# Patient Record
Sex: MALE | Race: WHITE | NOT HISPANIC OR LATINO | Employment: OTHER | URBAN - METROPOLITAN AREA
[De-identification: names, ages, dates, MRNs, and addresses within clinical notes are randomized per-mention and may not be internally consistent; named-entity substitution may affect disease eponyms.]

---

## 2018-06-27 ENCOUNTER — OFFICE VISIT (OUTPATIENT)
Dept: PULMONOLOGY | Facility: CLINIC | Age: 63
End: 2018-06-27
Payer: COMMERCIAL

## 2018-06-27 VITALS
OXYGEN SATURATION: 97 % | DIASTOLIC BLOOD PRESSURE: 62 MMHG | HEART RATE: 85 BPM | HEIGHT: 69 IN | RESPIRATION RATE: 18 BRPM | SYSTOLIC BLOOD PRESSURE: 118 MMHG | WEIGHT: 315 LBS | BODY MASS INDEX: 46.65 KG/M2

## 2018-06-27 DIAGNOSIS — Z87.09 HISTORY OF RESPIRATORY FAILURE: ICD-10-CM

## 2018-06-27 DIAGNOSIS — G47.33 OBSTRUCTIVE SLEEP APNEA ON CPAP: Primary | ICD-10-CM

## 2018-06-27 DIAGNOSIS — R06.83 SNORING: ICD-10-CM

## 2018-06-27 DIAGNOSIS — G47.19 EXCESSIVE DAYTIME SLEEPINESS: ICD-10-CM

## 2018-06-27 DIAGNOSIS — R06.81 WITNESSED APNEIC SPELLS: ICD-10-CM

## 2018-06-27 PROCEDURE — 3008F BODY MASS INDEX DOCD: CPT | Mod: CPTII,S$GLB,, | Performed by: INTERNAL MEDICINE

## 2018-06-27 PROCEDURE — 99999 PR PBB SHADOW E&M-NEW PATIENT-LVL III: CPT | Mod: PBBFAC,,, | Performed by: INTERNAL MEDICINE

## 2018-06-27 PROCEDURE — 99204 OFFICE O/P NEW MOD 45 MIN: CPT | Mod: S$GLB,,, | Performed by: INTERNAL MEDICINE

## 2018-06-27 RX ORDER — MELOXICAM 15 MG/1
TABLET ORAL
COMMUNITY
Start: 2018-06-01

## 2018-06-27 RX ORDER — NEBIVOLOL 10 MG/1
10 TABLET ORAL DAILY
COMMUNITY

## 2018-06-27 RX ORDER — SITAGLIPTIN 100 MG/1
TABLET, FILM COATED ORAL
COMMUNITY
Start: 2018-06-02

## 2018-06-27 RX ORDER — TAMSULOSIN HYDROCHLORIDE 0.4 MG/1
CAPSULE ORAL
COMMUNITY
Start: 2018-04-25

## 2018-06-27 RX ORDER — LISINOPRIL AND HYDROCHLOROTHIAZIDE 20; 25 MG/1; MG/1
TABLET ORAL
COMMUNITY
Start: 2018-04-10

## 2018-06-27 RX ORDER — METFORMIN HYDROCHLORIDE 1000 MG/1
TABLET ORAL
COMMUNITY
Start: 2018-06-01

## 2018-06-27 RX ORDER — FUROSEMIDE 40 MG/1
TABLET ORAL
COMMUNITY
Start: 2018-06-21

## 2018-06-27 RX ORDER — ATORVASTATIN CALCIUM 20 MG/1
TABLET, FILM COATED ORAL
COMMUNITY
Start: 2018-04-10

## 2018-06-27 NOTE — PROGRESS NOTES
Initial Outpatient Pulmonary Evaluation       SUBJECTIVE:     History of Present Illness:  Patient is a 63 y.o. male referred for preop evaluation.  The patient is undergoing a left kidney lithotomy for a kidney stone.  He was advised to see a pulmonologist by his cardiologist, because 4 months ago while undergoing a cardiac procedure to have a stent placed, he had apnea, and had to be intubated.  Patient is known with obstructive sleep apnea last sleep study was done 3 years ago, he is on CPAP compliant with CPAP still have some snoring, feels tired, and has excessive daytime sleepiness with [place] Sleepiness Scale score today of 20.  Never smoker.  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 and wakes up 6-7 a.m..      Chief Complaint   Patient presents with    Sleep Apnea       Review of patient's allergies indicates:   Allergen Reactions    Lisinopril      Hiccups       Current Outpatient Prescriptions   Medication Sig Dispense Refill    nebivolol (BYSTOLIC) 10 MG Tab Take 10 mg by mouth once daily.      atorvastatin (LIPITOR) 20 MG tablet       furosemide (LASIX) 40 MG tablet       JANUVIA 100 mg Tab       lisinopril-hydrochlorothiazide (PRINZIDE,ZESTORETIC) 20-25 mg Tab       meloxicam (MOBIC) 15 MG tablet       metFORMIN (GLUCOPHAGE) 1000 MG tablet       tamsulosin (FLOMAX) 0.4 mg Cp24        No current facility-administered medications for this visit.        Past Medical History:   Diagnosis Date    Anemia     Arthritis     Diabetes mellitus     Hypertension     Pneumonia      Past Surgical History:   Procedure Laterality Date    [facility]10[facility]2018    [facility]    UMBILICAL HERNIA REPAIR Left 2015    [facility]     Family History   Problem Relation Age of Onset    Alzheimer's disease Mother     Cancer Father     Hypertension Sister     Hypertension Brother      Social History  "  Substance Use Topics    Smoking status: Never Smoker    Smokeless tobacco: Never Used    Alcohol use Yes      Comment: Occasionally/socially        Review of Systems:  Review of Systems   Constitutional: Positive for fatigue.   HENT:        Snoring   Eyes: Negative for visual disturbance.   Respiratory: Positive for apnea.    Cardiovascular: Negative for chest pain.        Coronary artery disease status post stent   Gastrointestinal: Negative for abdominal pain.   Endocrine:        Diabetes mellitus   Genitourinary: Positive for frequency.        Kidney stone   Musculoskeletal: Positive for arthralgias.   Skin: Negative for rash.   Allergic/Immunologic: Negative for immunocompromised state.   Neurological: Negative for seizures and speech difficulty.   Psychiatric/Behavioral: Negative for behavioral problems.       OBJECTIVE:     Vital Signs (Most Recent)  Pulse: 85 (06/27/18 0845)  Resp: 18 (06/27/18 0845)  BP: 118/62 (06/27/18 0845)  SpO2: 97 % (06/27/18 0845)  5' 9" (1.753 m)  (!) 149 kg (328 lb 7.8 oz)     Physical Exam:  Physical Exam   Constitutional: He is oriented to person, place, and time. He appears well-developed and well-nourished.   HENT:   Head: Normocephalic and atraumatic.   Mallampati 1   Eyes: EOM are normal.   Neck: Neck supple.   Neck circumference 23 in   Cardiovascular: Normal rate and regular rhythm.    Pulmonary/Chest: Effort normal.   Breath sounds decreased bilaterally   Abdominal: Soft. There is no tenderness.   Musculoskeletal: He exhibits no edema.   Neurological: He is alert and oriented to person, place, and time.   Skin: Skin is warm.         ASSESSMENT/PLAN:   Obstructive sleep apnea on CPAP    History of  respiratory failure intraop    Excessive daytime sleepiness    Snoring    Witnessed apneic spells    BMI 48      I have instructed the patient to bring his outpatient sleep study.  As well as his machine to download compliance report.    General weight loss/lifestyle " modification strategies discussed (elicit support from others; identify saboteurs; non-food rewards).  Diet interventions: low calorie (1000 kCal/d) deficit diet      Improving sleep hygiene was discussed with the patient  Advise to Sleep as long as necessary to feel rested (usually seven to eight hours for adults) and then get out of bed  Maintain a regular sleep schedule, particularly a regular wake-up time in the morning  Try not to force sleep  Avoid caffeinated beverages after lunch, alcohol near bedtime , smoking or other nicotine intake, particularly during the evening  Adjust the bedroom environment as needed to decrease stimuli (reduce ambient light, turn off the television or radio)  Avoid prolonged use of light-emitting screens (laptops, tablets, smartphones, ebooks) before bedtime   Resolve concerns or worries before bedtime  Exercise regularly for at least 20 minutes, preferably more than four to five hours prior to bedtime   Avoid daytime naps, especially if they are longer than 20 to 30 minutes or occur late in the day      Patient is at increased risk of postop pulmonary complications, including respiratory failure, I would advice to do his surgery under general anesthesia with intubation from the beginning of the procedure, and I would advised also to extubate him to CPAP.  No contraindication to proceed with planned surgery from respiratory standpoint.    Further recommendation to be done after I review his sleep study and his compliance report.    I will fax this note to Dr. Del Castillo surgery, Dr Saldivar cardiology.     Follow-up in about 6 weeks (around 8/8/2018).    This note was prepared using voice recognition system and is likely to have sound alike errors that may have been overlooked even after proof reading.  Please call me with any questions    Discussed diagnosis, its evaluation, treatment and usual course. All questions answered.    Thank you for the courtesy of participating in the care of  this patient    Yonis Bustillo MD

## 2018-06-27 NOTE — PATIENT INSTRUCTIONS
Improving sleep hygiene was discussed with the patient  Advise to Sleep as long as necessary to feel rested (usually seven to eight hours for adults) and then get out of bed  Maintain a regular sleep schedule, particularly a regular wake-up time in the morning  Try not to force sleep  Avoid caffeinated beverages after lunch, alcohol near bedtime , smoking or other nicotine intake, particularly during the evening  Adjust the bedroom environment as needed to decrease stimuli (reduce ambient light, turn off the television or radio)  Avoid prolonged use of light-emitting screens (laptops, tablets, smartphones, [employer]) before bedtime   Resolve concerns or worries before bedtime  Exercise regularly for at least 20 minutes, preferably more than four to five hours prior to bedtime   Avoid daytime naps, especially if they are longer than 20 to 30 minutes or occur late in the day    General weight loss/lifestyle modification strategies discussed (elicit support from others; identify saboteurs; non-food rewards).  Diet interventions: low calorie (1000 kCal/d) deficit diet

## 2018-08-24 ENCOUNTER — OFFICE VISIT (OUTPATIENT)
Dept: PULMONOLOGY | Facility: CLINIC | Age: 63
End: 2018-08-24
Payer: COMMERCIAL

## 2018-08-24 VITALS
SYSTOLIC BLOOD PRESSURE: 110 MMHG | BODY MASS INDEX: 46.65 KG/M2 | OXYGEN SATURATION: 96 % | WEIGHT: 315 LBS | HEART RATE: 75 BPM | RESPIRATION RATE: 17 BRPM | HEIGHT: 69 IN | DIASTOLIC BLOOD PRESSURE: 58 MMHG

## 2018-08-24 DIAGNOSIS — N20.0 KIDNEY STONE: ICD-10-CM

## 2018-08-24 DIAGNOSIS — Z87.09 HISTORY OF RESPIRATORY FAILURE: ICD-10-CM

## 2018-08-24 DIAGNOSIS — G47.61 PERIODIC LIMB MOVEMENT DISORDER: ICD-10-CM

## 2018-08-24 DIAGNOSIS — G47.33 OBSTRUCTIVE SLEEP APNEA ON CPAP: Primary | ICD-10-CM

## 2018-08-24 PROCEDURE — 3008F BODY MASS INDEX DOCD: CPT | Mod: CPTII,S$GLB,, | Performed by: INTERNAL MEDICINE

## 2018-08-24 PROCEDURE — 99999 PR PBB SHADOW E&M-EST. PATIENT-LVL III: CPT | Mod: PBBFAC,,, | Performed by: INTERNAL MEDICINE

## 2018-08-24 PROCEDURE — 99214 OFFICE O/P EST MOD 30 MIN: CPT | Mod: S$GLB,,, | Performed by: INTERNAL MEDICINE

## 2018-08-24 RX ORDER — DULOXETIN HYDROCHLORIDE 30 MG/1
CAPSULE, DELAYED RELEASE ORAL
COMMUNITY
Start: 2018-07-06

## 2018-08-24 RX ORDER — ACETAMINOPHEN 500 MG
TABLET ORAL
COMMUNITY

## 2018-08-24 RX ORDER — HYDROCODONE BITARTRATE AND ACETAMINOPHEN 10; 325 MG/1; MG/1
TABLET ORAL
COMMUNITY
Start: 2018-07-24

## 2018-08-24 RX ORDER — MULTIVITAMIN
1 TABLET ORAL
COMMUNITY

## 2018-08-24 NOTE — PROGRESS NOTES
Pulmonary Outpatient Follow Up Visit     Subjective:       Patient ID: Dwayne Mack is a 63 y.o. male.    Chief Complaint: Sleep Apnea      HPI     63-year-old male patient initially seen end of June 2018, for preop evaluation, he is supposed to go for left kidney lithotomy for kidney stone.    During a cardiac procedure for 5 months ago, the patient had apnea and had to be intubated.    He is known with obstructive sleep apnea according to in-lab split night polysomnography 2014.  Showed  events per hour, was titrated to CPAP 18 cm.  Patient states that he is compliant with CPAP.  He is noticing lately that he opens his mouth during the sleep.  Does not use chin strap.  Denies coughing wheezing shortness of breath. Denies chest pain.    He was advised to see a pulmonologist by his cardiologist, because 4 months ago while undergoing a cardiac procedure to have a stent placed, he had apnea, and had to be intubated    Never smoker.  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 and wakes up 6-7 a.m..      Review of Systems    Review of Systems   Constitutional: Positive for fatigue.   HENT:        Snoring   Eyes: Negative for visual disturbance.   Respiratory: Positive for apnea.    Cardiovascular: Negative for chest pain.        Coronary artery disease status post stent   Gastrointestinal: Negative for abdominal pain.   Endocrine:        Diabetes mellitus   Genitourinary: Positive for frequency.        Kidney stone   Musculoskeletal: Positive for arthralgias.   Skin: Negative for rash.   Allergic/Immunologic: Negative for immunocompromised state.   Neurological: Negative for seizures and speech difficulty.   Psychiatric/Behavioral: Negative for behavioral problems.       Outpatient Encounter Medications as of 8/24/2018   Medication Sig Dispense Refill    atorvastatin (LIPITOR) 20 MG tablet       cholecalciferol, vitamin D3, 5,000 unit Tab  "Take by mouth.      DULoxetine (CYMBALTA) 30 MG capsule       furosemide (LASIX) 40 MG tablet       HYDROcodone-acetaminophen (NORCO)  mg per tablet       IRON, FERROUS SULFATE, ORAL Take by mouth.      JANUVIA 100 mg Tab       lisinopril-hydrochlorothiazide (PRINZIDE,ZESTORETIC) 20-25 mg Tab       meloxicam (MOBIC) 15 MG tablet       metFORMIN (GLUCOPHAGE) 1000 MG tablet       multivitamin (THERAGRAN) per tablet Take 1 tablet by mouth.      nebivolol (BYSTOLIC) 10 MG Tab Take 10 mg by mouth once daily.      tamsulosin (FLOMAX) 0.4 mg Cp24        No facility-administered encounter medications on file as of 8/24/2018.        Objective:     Vital Signs (Most Recent)  Vital Signs  Pulse: 75  Resp: 17  SpO2: 96 %  BP: (!) 110/58  Height and Weight  Height: 5' 9" (175.3 cm)  Weight: (!) 149.3 kg (329 lb 2.4 oz)  BSA (Calculated - sq m): 2.7 sq meters  BMI (Calculated): 48.7  Weight in (lb) to have BMI = 25: 168.9]  Wt Readings from Last 3 Encounters:   08/24/18 (!) 149.3 kg (329 lb 2.4 oz)   06/27/18 (!) 149 kg (328 lb 7.8 oz)     Temp Readings from Last 3 Encounters:   No data found for Temp     Height: 5' 9" (175.3 cm)          Physical Exam  Physical Exam   Constitutional: He is oriented to person, place, and time. He appears well-developed and well-nourished.   HENT:   Head: Normocephalic and atraumatic.   Mallampati 1   Eyes: EOM are normal.   Neck: Neck supple.   Neck circumference 23 in   Cardiovascular: Normal rate and regular rhythm.    Pulmonary/Chest: Effort normal.   Breath sounds decreased bilaterally   Abdominal: Soft. There is no tenderness.   Musculoskeletal: He exhibits no edema.   Neurological: He is alert and oriented to person, place, and time.   Skin: Skin is warm.        Diagnostic Results:    Pulmonary Function Tests 8/24/2018   SpO2 96   Height 69.000   Weight 5266.35   BMI (Calculated) 48.7   Some recent data might be hidden     Chest x-ray July 2018 at [facility] showed thoracic spine abnormalities.  Clear lungs.      Split Night polysomnography report done at [facility] in January 2014 was reviewed.    The patient weight was 335 lb, his weight today is 329 lb.    Patient showed severe obstructive sleep apnea with an AHI of 100 events per hour.  He was titrated to CPAP of 18 cm.    Also patient showed significant periodic limb movement disorder.    Assessment/Plan:   Obstructive sleep apnea on CPAP  -     CPAP/BIPAP SUPPLIES    History of respiratory failure    BMI 45.0-49.9, adult    Periodic limb movement disorder    Kidney stone       Encouraged the patient to continue his compliance with CPAP 18 cm water during sleep.  I will prescribe him today a chin strap to avoid mouth opening during sleep.    Patient is at increased risk for postop pulmonary complications, including respiratory failure , however no contraindication to proceed with left kidney lithotomy,  I recommend  to extubate him to CPAP, use CPAP perioperatively.   I recommend postop pain control, incentive spirometry, and DVT prophylaxis.    I will fax this note to Dr. Del Castillo surgery, Dr Saldivar cardiology.     Patient will be seeing Dr.Raju Hunt urology in [facility].    Follow-up in about 3 months (around 11/24/2018).    This note was prepared using voice recognition system and is likely to have sound alike errors that may have been overlooked even after proof reading.  Please call me with any questions    Discussed diagnosis, its evaluation, treatment and usual course. All questions answered.    Thank you for the courtesy of participating in the care of this patient    Yonis Bustillo MD

## 2018-10-29 ENCOUNTER — HOSPITAL ENCOUNTER (EMERGENCY)
Facility: HOSPITAL | Age: 63
Discharge: OTHER INSTITUTION | End: 2018-10-29
Attending: EMERGENCY MEDICINE
Payer: COMMERCIAL

## 2018-10-29 VITALS
HEIGHT: 69 IN | WEIGHT: 315 LBS | RESPIRATION RATE: 20 BRPM | TEMPERATURE: 99 F | HEART RATE: 65 BPM | DIASTOLIC BLOOD PRESSURE: 54 MMHG | SYSTOLIC BLOOD PRESSURE: 115 MMHG | OXYGEN SATURATION: 96 % | BODY MASS INDEX: 46.65 KG/M2

## 2018-10-29 DIAGNOSIS — N13.9: ICD-10-CM

## 2018-10-29 DIAGNOSIS — R31.0 GROSS HEMATURIA: Primary | ICD-10-CM

## 2018-10-29 DIAGNOSIS — R61 DIAPHORESIS: ICD-10-CM

## 2018-10-29 LAB
ALBUMIN SERPL BCP-MCNC: 3.2 G/DL
ALP SERPL-CCNC: 61 U/L
ALT SERPL W/O P-5'-P-CCNC: 22 U/L
ANION GAP SERPL CALC-SCNC: 12 MMOL/L
APTT BLDCRRT: 27 SEC
AST SERPL-CCNC: 15 U/L
BASOPHILS # BLD AUTO: 0.01 K/UL
BASOPHILS NFR BLD: 0.1 %
BILIRUB SERPL-MCNC: 0.3 MG/DL
BILIRUB UR QL STRIP: ABNORMAL
BUN SERPL-MCNC: 19 MG/DL
CALCIUM SERPL-MCNC: 9.1 MG/DL
CHLORIDE SERPL-SCNC: 104 MMOL/L
CLARITY UR: ABNORMAL
CO2 SERPL-SCNC: 26 MMOL/L
COLOR UR: ABNORMAL
CREAT SERPL-MCNC: 0.9 MG/DL
DIFFERENTIAL METHOD: ABNORMAL
EOSINOPHIL # BLD AUTO: 0.2 K/UL
EOSINOPHIL NFR BLD: 2.5 %
ERYTHROCYTE [DISTWIDTH] IN BLOOD BY AUTOMATED COUNT: 15.3 %
EST. GFR  (AFRICAN AMERICAN): >60 ML/MIN/1.73 M^2
EST. GFR  (NON AFRICAN AMERICAN): >60 ML/MIN/1.73 M^2
GLUCOSE SERPL-MCNC: 197 MG/DL
GLUCOSE UR QL STRIP: ABNORMAL
HCT VFR BLD AUTO: 34.4 %
HGB BLD-MCNC: 10.9 G/DL
HGB UR QL STRIP: ABNORMAL
INR PPP: 1.3
KETONES UR QL STRIP: ABNORMAL
LEUKOCYTE ESTERASE UR QL STRIP: ABNORMAL
LYMPHOCYTES # BLD AUTO: 3.4 K/UL
LYMPHOCYTES NFR BLD: 35.7 %
MCH RBC QN AUTO: 25.9 PG
MCHC RBC AUTO-ENTMCNC: 31.7 G/DL
MCV RBC AUTO: 82 FL
MICROSCOPIC COMMENT: ABNORMAL
MONOCYTES # BLD AUTO: 0.7 K/UL
MONOCYTES NFR BLD: 7.2 %
NEUTROPHILS # BLD AUTO: 5.2 K/UL
NEUTROPHILS NFR BLD: 54.5 %
NITRITE UR QL STRIP: ABNORMAL
PH UR STRIP: ABNORMAL [PH] (ref 5–8)
PLATELET # BLD AUTO: 439 K/UL
PMV BLD AUTO: 8.7 FL
POTASSIUM SERPL-SCNC: 3.5 MMOL/L
PROT SERPL-MCNC: 7 G/DL
PROT UR QL STRIP: ABNORMAL
PROTHROMBIN TIME: 12.8 SEC
RBC # BLD AUTO: 4.21 M/UL
RBC #/AREA URNS HPF: >100 /HPF (ref 0–4)
SODIUM SERPL-SCNC: 142 MMOL/L
SP GR UR STRIP: ABNORMAL (ref 1–1.03)
TROPONIN I SERPL DL<=0.01 NG/ML-MCNC: <0.006 NG/ML
URN SPEC COLLECT METH UR: ABNORMAL
UROBILINOGEN UR STRIP-ACNC: ABNORMAL EU/DL
WBC # BLD AUTO: 9.56 K/UL

## 2018-10-29 PROCEDURE — 85025 COMPLETE CBC W/AUTO DIFF WBC: CPT

## 2018-10-29 PROCEDURE — 85610 PROTHROMBIN TIME: CPT

## 2018-10-29 PROCEDURE — 85730 THROMBOPLASTIN TIME PARTIAL: CPT

## 2018-10-29 PROCEDURE — P9612 CATHETERIZE FOR URINE SPEC: HCPCS

## 2018-10-29 PROCEDURE — 96376 TX/PRO/DX INJ SAME DRUG ADON: CPT

## 2018-10-29 PROCEDURE — 99285 EMERGENCY DEPT VISIT HI MDM: CPT | Mod: 25

## 2018-10-29 PROCEDURE — 96361 HYDRATE IV INFUSION ADD-ON: CPT

## 2018-10-29 PROCEDURE — 96375 TX/PRO/DX INJ NEW DRUG ADDON: CPT

## 2018-10-29 PROCEDURE — 93005 ELECTROCARDIOGRAM TRACING: CPT

## 2018-10-29 PROCEDURE — 87086 URINE CULTURE/COLONY COUNT: CPT

## 2018-10-29 PROCEDURE — 63600175 PHARM REV CODE 636 W HCPCS: Performed by: EMERGENCY MEDICINE

## 2018-10-29 PROCEDURE — 81000 URINALYSIS NONAUTO W/SCOPE: CPT

## 2018-10-29 PROCEDURE — 80053 COMPREHEN METABOLIC PANEL: CPT

## 2018-10-29 PROCEDURE — 93010 ELECTROCARDIOGRAM REPORT: CPT | Mod: ,,, | Performed by: INTERNAL MEDICINE

## 2018-10-29 PROCEDURE — 84484 ASSAY OF TROPONIN QUANT: CPT

## 2018-10-29 PROCEDURE — 96374 THER/PROPH/DIAG INJ IV PUSH: CPT

## 2018-10-29 PROCEDURE — 25000003 PHARM REV CODE 250: Performed by: EMERGENCY MEDICINE

## 2018-10-29 RX ORDER — MORPHINE SULFATE 4 MG/ML
4 INJECTION, SOLUTION INTRAMUSCULAR; INTRAVENOUS
Status: COMPLETED | OUTPATIENT
Start: 2018-10-29 | End: 2018-10-29

## 2018-10-29 RX ORDER — PHENAZOPYRIDINE HYDROCHLORIDE 100 MG/1
200 TABLET, FILM COATED ORAL
Status: COMPLETED | OUTPATIENT
Start: 2018-10-29 | End: 2018-10-29

## 2018-10-29 RX ORDER — PHENAZOPYRIDINE HYDROCHLORIDE 100 MG/1
200 TABLET, FILM COATED ORAL
Status: DISCONTINUED | OUTPATIENT
Start: 2018-10-29 | End: 2018-10-29 | Stop reason: HOSPADM

## 2018-10-29 RX ORDER — ONDANSETRON 2 MG/ML
4 INJECTION INTRAMUSCULAR; INTRAVENOUS
Status: COMPLETED | OUTPATIENT
Start: 2018-10-29 | End: 2018-10-29

## 2018-10-29 RX ADMIN — ONDANSETRON 4 MG: 2 INJECTION, SOLUTION INTRAMUSCULAR; INTRAVENOUS at 01:10

## 2018-10-29 RX ADMIN — PHENAZOPYRIDINE 200 MG: 100 TABLET ORAL at 04:10

## 2018-10-29 RX ADMIN — MORPHINE SULFATE 4 MG: 4 INJECTION INTRAVENOUS at 02:10

## 2018-10-29 RX ADMIN — SODIUM CHLORIDE 500 ML: 0.9 INJECTION, SOLUTION INTRAVENOUS at 04:10

## 2018-10-29 RX ADMIN — MORPHINE SULFATE 4 MG: 4 INJECTION INTRAVENOUS at 01:10

## 2018-10-29 NOTE — ED NOTES
Spoke with Mecca from [facility] Referral [place]. Patient been accepted to [facility] ED under the care of Dr. Dasilva urology. [facility] to set up patient transport. Patient updated on plan of care.

## 2018-10-29 NOTE — ED NOTES
Patient complains of urine in blood . *.    Level of Consciousness: The patient is awake, alert, and oriented with appropriate affect and speech; oriented to person, place and time.  Appearance: Sitting up in bed  with  acute distress noted. Clothing and hygiene are clean and worn appropriately.  Skin: Skin is warm and dry with good skin turgor; intact; color consistent with ethnicity.  Mucous membranes are moist.   Musculoskeletal: Moves all extremities well in full range of motion. No obvious deformities or swelling noted.  Respiratory: Airway open and patent, respirations spontaneous, even and unlabored. No accessory muscles in use. Breath sounds clear .  Cardiac: Regular rate and rhythm, no peripheral edema noted, good pulses palpated peripherally, capillary refill < 3 seconds.  Abdomen: Soft, non-tender to palpation. No distention noted.  Neurologic: PERRLA, face exhibits symmetrical expression, hand grasps equal and even bilaterally, reports normal sensation to all extremities and face.  Psychosocial: anxious and in pain     Patient verbalized understanding of status and plan of care. Patient changed into hospital gown with assistance. Side rails up x 2, call light in reach, bed low and locked. Cardiac monitor applied to patient; alarms on, at bedside labs sent . Will continue to monitor.

## 2018-10-29 NOTE — ED PROVIDER NOTES
"SCRIBE #1 NOTE: I, Raya Hansen, am scribing for, and in the presence of, Ivan Garza Jr., MD. I have scribed the entire note.      History      Chief Complaint   Patient presents with    Hematuria     pt reports urinating blood tonight.        Review of patient's allergies indicates:   Allergen Reactions    Ace inhibitors     Lisinopril      Hiccups        HPI   HPI    10/29/2018, 12:36 AM   History obtained from the patient and wife      History of Present Illness: Dwayne Mack is a 63 y.o. male patient with a PMHx of CHF, HTN, DM, kidney stones, s/p kidney stone removal surgery and stent placement on 10/17/18 at [facility]  who presents to the Emergency Department for hematuria which onset several minutes ago. Wife states that "they removed only 90% of the kidney stone". Symptoms are episodic and moderate in severity. No mitigating or exacerbating factors reported. Associated sxs include L lower abd pain. Patient denies any CP, SOB, fever, chills, constipation, hematochezia, dysuria, urinary frequency, n/v/d, and all other sxs at this time. No further complaints or concerns at this time.         Arrival mode: Personal vehicle     PCP: Primary Doctor No       Past Medical History:  Past Medical History:   Diagnosis Date    Anemia     Arthritis     Diabetes mellitus     Hypertension     Pneumonia        Past Surgical History:  Past Surgical History:   Procedure Laterality Date    [facility]10[facility]2018    [facility]    UMBILICAL HERNIA REPAIR Left 2015    [facility]         Family History:  Family History   Problem Relation Age of Onset    Alzheimer's disease Mother     Cancer Father     Hypertension Sister     Hypertension Brother        Social History:  Social History     Tobacco Use    Smoking status: Never Smoker    Smokeless tobacco: Never Used   Substance and Sexual Activity    Alcohol use: Yes     Comment: Occasionally/socially    Drug use: No    " Sexual activity: Unknown       ROS   Review of Systems   Constitutional: Negative for chills and fever.   HENT: Negative for sore throat.    Respiratory: Negative for shortness of breath.    Cardiovascular: Negative for chest pain.   Gastrointestinal: Positive for abdominal pain. Negative for blood in stool, constipation, diarrhea, nausea and vomiting.   Genitourinary: Positive for hematuria. Negative for dysuria and frequency.   Musculoskeletal: Negative for back pain.   Skin: Negative for rash.   Neurological: Negative for weakness.   Hematological: Does not bruise/bleed easily.   All other systems reviewed and are negative.      Physical Exam      Initial Vitals   BP Pulse Resp Temp SpO2   10/29/18 0029 10/29/18 0029 10/29/18 0029 10/29/18 0300 10/29/18 0029   (!) 128/98 89 (!) 25 98.5 °F (36.9 °C) 98 %      MAP       --                 Physical Exam  Nursing Notes and Vital Signs Reviewed.  Constitutional: Patient is in mild distress. Anxious.  Head: Atraumatic. Normocephalic.  Eyes: PERRL. EOM intact. Conjunctivae are not pale. No scleral icterus.  ENT: Mucous membranes are moist. Oropharynx is clear and symmetric.    Neck: Supple. Full ROM. No lymphadenopathy.  Cardiovascular: Regular rate. Regular rhythm. No murmurs, rubs, or gallops. Distal pulses are 2+ and symmetric.  Pulmonary/Chest: No respiratory distress. Clear to auscultation bilaterally. No wheezing or rales.  Abdominal: Soft and non-distended.  There is no tenderness.  No rebound, guarding, or rigidity.   Musculoskeletal: Moves all extremities. No obvious deformities. No edema.   Skin: Diaphoretic.  Neurological:  Alert, awake, and appropriate.  Normal speech.  No acute focal neurological deficits are appreciated.  Psychiatric: Anxious.    ED Course    Procedures  ED Vital Signs:  Vitals:    10/29/18 0029 10/29/18 0033 10/29/18 0117 10/29/18 0132   BP: (!) 128/98  (!) 109/57 (!) 107/53   Pulse: 89 91 63 64   Resp: (!) 25   (!) 26   Temp:      "  TempSrc:       SpO2: 98%  97% 98%   Weight: (!) 149.7 kg (330 lb)      Height: 5' 9" (1.753 m)       10/29/18 0159 10/29/18 0300 10/29/18 0402   BP: (!) 112/55  (!) 91/53   Pulse: 70  62   Resp: 20  (!) 23   Temp:  98.5 °F (36.9 °C)    TempSrc:  Axillary    SpO2: 97%  99%   Weight:      Height:          Abnormal Lab Results:  Labs Reviewed   CBC W/ AUTO DIFFERENTIAL - Abnormal; Notable for the following components:       Result Value    RBC 4.21 (*)     Hemoglobin 10.9 (*)     Hematocrit 34.4 (*)     MCH 25.9 (*)     MCHC 31.7 (*)     RDW 15.3 (*)     Platelets 439 (*)     MPV 8.7 (*)     All other components within normal limits   COMPREHENSIVE METABOLIC PANEL - Abnormal; Notable for the following components:    Glucose 197 (*)     Albumin 3.2 (*)     All other components within normal limits   PROTIME-INR - Abnormal; Notable for the following components:    Prothrombin Time 12.8 (*)     INR 1.3 (*)     All other components within normal limits   URINALYSIS - Abnormal; Notable for the following components:    Color, UA Red (*)     Appearance, UA Cloudy (*)     All other components within normal limits   URINALYSIS MICROSCOPIC - Abnormal; Notable for the following components:    RBC, UA >100 (*)     All other components within normal limits   CULTURE, URINE   TROPONIN I   APTT        All Lab Results:  Results for orders placed or performed during the hospital encounter of 10/29/18   CBC auto differential   Result Value Ref Range    WBC 9.56 3.90 - 12.70 K/uL    RBC 4.21 (L) 4.60 - 6.20 M/uL    Hemoglobin 10.9 (L) 14.0 - 18.0 g/dL    Hematocrit 34.4 (L) 40.0 - 54.0 %    MCV 82 82 - 98 fL    MCH 25.9 (L) 27.0 - 31.0 pg    MCHC 31.7 (L) 32.0 - 36.0 g/dL    RDW 15.3 (H) 11.5 - 14.5 %    Platelets 439 (H) 150 - 350 K/uL    MPV 8.7 (L) 9.2 - 12.9 fL    Gran # (ANC) 5.2 1.8 - 7.7 K/uL    Lymph # 3.4 1.0 - 4.8 K/uL    Mono # 0.7 0.3 - 1.0 K/uL    Eos # 0.2 0.0 - 0.5 K/uL    Baso # 0.01 0.00 - 0.20 K/uL    Gran% 54.5 38.0 " - 73.0 %    Lymph% 35.7 18.0 - 48.0 %    Mono% 7.2 4.0 - 15.0 %    Eosinophil% 2.5 0.0 - 8.0 %    Basophil% 0.1 0.0 - 1.9 %    Differential Method Automated    Comprehensive metabolic panel   Result Value Ref Range    Sodium 142 136 - 145 mmol/L    Potassium 3.5 3.5 - 5.1 mmol/L    Chloride 104 95 - 110 mmol/L    CO2 26 23 - 29 mmol/L    Glucose 197 (H) 70 - 110 mg/dL    BUN, Bld 19 8 - 23 mg/dL    Creatinine 0.9 0.5 - 1.4 mg/dL    Calcium 9.1 8.7 - 10.5 mg/dL    Total Protein 7.0 6.0 - 8.4 g/dL    Albumin 3.2 (L) 3.5 - 5.2 g/dL    Total Bilirubin 0.3 0.1 - 1.0 mg/dL    Alkaline Phosphatase 61 55 - 135 U/L    AST 15 10 - 40 U/L    ALT 22 10 - 44 U/L    Anion Gap 12 8 - 16 mmol/L    eGFR if African American >60 >60 mL/min/1.73 m^2    eGFR if non African American >60 >60 mL/min/1.73 m^2   Troponin I   Result Value Ref Range    Troponin I <0.006 0.000 - 0.026 ng/mL   Protime-INR   Result Value Ref Range    Prothrombin Time 12.8 (H) 9.0 - 12.5 sec    INR 1.3 (H) 0.8 - 1.2   APTT   Result Value Ref Range    aPTT 27.0 21.0 - 32.0 sec   Urinalysis Catheterized   Result Value Ref Range    Specimen UA Urine, Catheterized     Color, UA Red (A) Yellow, Straw, Maci    Appearance, UA Cloudy (A) Clear    pH, UA SEE COMMENT 5.0 - 8.0    Specific Gravity, UA SEE COMMENT 1.005 - 1.030    Protein, UA SEE COMMENT Negative    Glucose, UA SEE COMMENT Negative    Ketones, UA SEE COMMENT Negative    Bilirubin (UA) SEE COMMENT Negative    Occult Blood UA SEE COMMENT Negative    Nitrite, UA SEE COMMENT Negative    Urobilinogen, UA SEE COMMENT <2.0 EU/dL    Leukocytes, UA SEE COMMENT Negative   Urinalysis Microscopic   Result Value Ref Range    RBC, UA >100 (H) 0 - 4 /hpf    Microscopic Comment SEE COMMENT          Imaging Results:  Imaging Results          CT Renal Stone Study ABD Pelvis WO (In process)                Per [facility], pt's CT results shows left double-J nephroureteral stent in place. The proximal pigtail of the  catheter may be lodged in the renal parenchyma in a recent percutaneous nephrostomy tract. 10 mm non obstructing left lower pole intrarenal calculus. Moderate left hydronephrosis with dense urine in the renal collecting system and bladder consistent with hematuria. Colonic diverticulosis without evidence of diverticulitis. Left inguinal hernia containing nonobstructed loop of upper sigmoid colon.     The EKG was ordered, reviewed, and independently interpreted by the ED provider.  Interpretation time: 0109  Rate: 67 BPM  Rhythm: normal sinus rhythm  Interpretation: Left axis deviation. Cannot rule out anterior infarct. No STEMI.             The Emergency Provider reviewed the vital signs and test results, which are outlined above.    ED Discussion     2:34 AM: Re-evaluated pt. D/w family and pt pertinent CT results.     2:55 AM  Patient is stable.  Has gross hematuria with decreased urinary output and suprapubic pain.  Has a ureteral stent status post what appears to be a percutaneous stone removal.  Bandages the left back with postoperative wounds which are clean dry and intact. He has dark red blood in his urine and urine output is somewhat diminished.  He does have some improvement with the Jackson catheter however.  Based on the amount of blood in the spasms that he is having, will start CBI to facilitate drainage and hopefully diminish his pain. We do not have Urology on-call today.  His urologist works at [facility] and consult at [facility].  The patient is family request transfer to that facility in light of her lack of services here.    4:20 AM  [facility] does not have bed accommodations for the patient.  After discussions with the family they requesting [facility].  The urologist  is listed as on staff there on the [facility] web site.  We have contacted the transfer center who speaking with the house supervisor at our [facility] states that he is not a rounding  staff member there.  We will readdress the issue with the family discussing transfer to [facility] where his surgery was done in a vice [facility] where he their daughter is a nurse.  They are requesting our [facility] understanding that they will not likely see Dr. westbrook.  Transfer center has been Re notified to contact the [facility] to see if they have Urology available as well as the bed    4:56 AM  Patient life notified that we are currently waiting for the [place] to get back with us.  They are aware of the situation.    5:08 AM: Consult with Briseida and Dr. Dasilva (Urology) at [facility] concerning pt. There are no urology services, which the patient requires, offered at [facility] at this time. Dr. Dasilva expresses understanding and will accept transfer for gross hematuria.  Accepting Facility: [facility]  Accepting Physician: Dr. Dasilva    5:09 AM: Re-evaluated pt. Informed pt and family that there are no  services available at this time. I have discussed test results, shared treatment plan, and the need for transfer with patient and family at bedside. All historical, clinical, radiographic, and laboratory findings were reviewed with the patient/family in detail. Patient will be transferred by [facility] services with  care required en route. Patient understands that there could be unforeseen motor vehicle accidents or loss of vital signs that could result in potential death or permanent disability. Pt and family express understanding at this time and agree with all information. All questions answered. Pt and family have no further questions or concerns at this time. Pt is ready for transfer.     5:11 AM  I discussed the case with Briseida house supervisor at our [facility] who in direct communication with Dr. medeiros is accepting the patient for urology services at our [facility].  This is by patient request understanding that his urologist is not on staff there.  Patient does not desire transfer to [facility] in [facility]  [place].    ED Medication(s):  Medications   phenazopyridine tablet 200 mg (200 mg Oral Canceled Entry 10/29/18 0400)   morphine injection 4 mg (4 mg Intravenous Given 10/29/18 0119)   ondansetron injection 4 mg (4 mg Intravenous Given 10/29/18 0119)   morphine injection 4 mg (4 mg Intravenous Given 10/29/18 0256)   sodium chloride 0.9% bolus 500 mL (500 mLs Intravenous New Bag 10/29/18 0410)   phenazopyridine tablet 200 mg (200 mg Oral Given 10/29/18 0400)             Medical Decision Making    Medical Decision Making:   Clinical Tests:   Lab Tests: Ordered and Reviewed  Radiological Study: Ordered and Reviewed  Medical Tests: Ordered and Reviewed           Scribe Attestation:   Scribe #1: I performed the above scribed service and the documentation accurately describes the services I performed. I attest to the accuracy of the note.    Attending:   Physician Attestation Statement for Scribe #1: I, Ivan Garza Jr., MD, personally performed the services described in this documentation, as scribed by Raya Hansen, in my presence, and it is both accurate and complete.          Clinical Impression       ICD-10-CM ICD-9-CM   1. Gross hematuria R31.0 599.71   2. Diaphoresis R61 780.8   3. Acquired obstruction of urinary tract N13.9 599.60       Disposition:   Disposition: Transferred  Condition: Stable         Ivan Garza Jr., MD  10/29/18 0538

## 2018-10-30 LAB — BACTERIA UR CULT: NO GROWTH

## 2019-01-05 ENCOUNTER — HOSPITAL ENCOUNTER (EMERGENCY)
Facility: HOSPITAL | Age: 64
Discharge: HOME | End: 2019-01-05
Attending: EMERGENCY MEDICINE
Payer: COMMERCIAL

## 2019-01-05 VITALS
BODY MASS INDEX: 46.65 KG/M2 | RESPIRATION RATE: 18 BRPM | HEART RATE: 73 BPM | TEMPERATURE: 98 F | DIASTOLIC BLOOD PRESSURE: 58 MMHG | HEIGHT: 69 IN | SYSTOLIC BLOOD PRESSURE: 122 MMHG | OXYGEN SATURATION: 98 % | WEIGHT: 315 LBS

## 2019-01-05 DIAGNOSIS — L08.9 INFECTED BITE WOUND: Primary | ICD-10-CM

## 2019-01-05 DIAGNOSIS — M79.89 RIGHT LEG SWELLING: ICD-10-CM

## 2019-01-05 DIAGNOSIS — T14.8XXA INFECTED BITE WOUND: Primary | ICD-10-CM

## 2019-01-05 DIAGNOSIS — L03.115 CELLULITIS OF RIGHT LEG: ICD-10-CM

## 2019-01-05 LAB
ALBUMIN SERPL BCP-MCNC: 3.6 G/DL
ALP SERPL-CCNC: 62 U/L
ALT SERPL W/O P-5'-P-CCNC: 22 U/L
ANION GAP SERPL CALC-SCNC: 18 MMOL/L
APTT BLDCRRT: 30.5 SEC
AST SERPL-CCNC: 33 U/L
BASOPHILS # BLD AUTO: 0.01 K/UL
BASOPHILS NFR BLD: 0.2 %
BILIRUB SERPL-MCNC: 0.3 MG/DL
BUN SERPL-MCNC: 20 MG/DL
CALCIUM SERPL-MCNC: 9.7 MG/DL
CHLORIDE SERPL-SCNC: 98 MMOL/L
CO2 SERPL-SCNC: 24 MMOL/L
CREAT SERPL-MCNC: 1.1 MG/DL
DIFFERENTIAL METHOD: ABNORMAL
EOSINOPHIL # BLD AUTO: 0 K/UL
EOSINOPHIL NFR BLD: 0.4 %
ERYTHROCYTE [DISTWIDTH] IN BLOOD BY AUTOMATED COUNT: 15.2 %
EST. GFR  (AFRICAN AMERICAN): >60 ML/MIN/1.73 M^2
EST. GFR  (NON AFRICAN AMERICAN): >60 ML/MIN/1.73 M^2
GLUCOSE SERPL-MCNC: 132 MG/DL
HCT VFR BLD AUTO: 39.2 %
HGB BLD-MCNC: 12.6 G/DL
INR PPP: 1.1
LYMPHOCYTES # BLD AUTO: 1.1 K/UL
LYMPHOCYTES NFR BLD: 21.1 %
MCH RBC QN AUTO: 25.5 PG
MCHC RBC AUTO-ENTMCNC: 32.1 G/DL
MCV RBC AUTO: 79 FL
MONOCYTES # BLD AUTO: 0.6 K/UL
MONOCYTES NFR BLD: 11.1 %
NEUTROPHILS # BLD AUTO: 3.6 K/UL
NEUTROPHILS NFR BLD: 67.2 %
PLATELET # BLD AUTO: 234 K/UL
PMV BLD AUTO: 9.7 FL
POTASSIUM SERPL-SCNC: 3.9 MMOL/L
PROT SERPL-MCNC: 7.8 G/DL
PROTHROMBIN TIME: 11.4 SEC
RBC # BLD AUTO: 4.95 M/UL
SODIUM SERPL-SCNC: 140 MMOL/L
WBC # BLD AUTO: 5.32 K/UL

## 2019-01-05 PROCEDURE — 85730 THROMBOPLASTIN TIME PARTIAL: CPT

## 2019-01-05 PROCEDURE — 80053 COMPREHEN METABOLIC PANEL: CPT

## 2019-01-05 PROCEDURE — 99284 EMERGENCY DEPT VISIT MOD MDM: CPT

## 2019-01-05 PROCEDURE — 85025 COMPLETE CBC W/AUTO DIFF WBC: CPT

## 2019-01-05 PROCEDURE — 85610 PROTHROMBIN TIME: CPT

## 2019-01-05 RX ORDER — MUPIROCIN 20 MG/G
OINTMENT TOPICAL 2 TIMES DAILY
Qty: 22 G | Refills: 0 | Status: SHIPPED | OUTPATIENT
Start: 2019-01-05

## 2019-01-05 RX ORDER — SULFAMETHOXAZOLE AND TRIMETHOPRIM 800; 160 MG/1; MG/1
1 TABLET ORAL 2 TIMES DAILY
Qty: 14 TABLET | Refills: 0 | Status: SHIPPED | OUTPATIENT
Start: 2019-01-05 | End: 2019-01-12

## 2019-01-05 NOTE — ED PROVIDER NOTES
SCRIBE #1 NOTE: I, Aline Parker, am scribing for, and in the presence of, Ashu Hunt Jr., MD. I have scribed the entire note.      History      Chief Complaint   Patient presents with    Leg Swelling     right leg. sent from [facility] for r/o blood clot       Review of patient's allergies indicates:   Allergen Reactions    Ace inhibitors     Lisinopril      Hiccups        HPI   HPI    1/5/2019, 4:55 PM   History obtained from the patient      History of Present Illness: Dwayne Mack is a 63 y.o. male patient with a PMHx of DM, HTN who presents to the Emergency Department for RLE swelling which onset gradually 2-3 days ago. Symptoms are constant and moderate in severity. No mitigating or exacerbating factors reported. Pt was referred to the ED from [facility] to r/o DVT. Associated sxs include RLE erythema. Patient denies any fever, chills, increased warmth, falls, trauma, extremity weakness/numbness, dizziness, recent travel/long car rides, calf pain, CP, SOB, palpitations, and all other sxs at this time. Pt reports he has been in bed the past week sick with the flu. No further complaints or concerns at this time.       Arrival mode: Personal vehicle      PCP: Primary Doctor No       Past Medical History:  Past Medical History:   Diagnosis Date    Anemia     Arthritis     Diabetes mellitus     Hypertension     Pneumonia        Past Surgical History:  Past Surgical History:   Procedure Laterality Date    ABDOM[facility]ENT  [facility]/10/2018    [facility]    KIDNEY STONE SURGERY  10/17/2018    UMBILICAL HERNIA REPAIR Left 2015    [facility]         Family History:  Family History   Problem Relation Age of Onset    Alzheimer's disease Mother     Cancer Father     Hypertension Sister     Hypertension Brother        Social History:  Social History     Tobacco Use    Smoking status: Never Smoker    Smokeless tobacco: Never Used   Substance and Sexual Activity    Alcohol use: Yes     Comment:  Occasionally/socially    Drug use: No    Sexual activity: unknown       ROS   Review of Systems   Constitutional: Negative for chills and fever.   HENT: Negative for sore throat.    Respiratory: Negative for shortness of breath.    Cardiovascular: Positive for leg swelling (RLE). Negative for chest pain and palpitations.   Gastrointestinal: Negative for nausea.   Genitourinary: Negative for dysuria.   Musculoskeletal: Negative for back pain and myalgias.   Skin: Positive for color change (erythema, RLE). Negative for rash.        (-) increased warmth   Neurological: Negative for weakness and numbness.   Hematological: Does not bruise/bleed easily.   All other systems reviewed and are negative.    Physical Exam      Initial Vitals [01/05/19 1651]   BP Pulse Resp Temp SpO2   (!) 155/74 89 20 98.3 °F (36.8 °C) 96 %      MAP       --          Physical Exam  Nursing Notes and Vital Signs Reviewed.  Constitutional: Patient is in no acute distress. Well-developed and well-nourished.  Head: Atraumatic. Normocephalic.  Eyes: PERRL. EOM intact. Conjunctivae are not pale. No scleral icterus.  ENT: Mucous membranes are moist. Oropharynx is clear and symmetric.    Neck: Supple. Full ROM. No lymphadenopathy.  Cardiovascular: Regular rate. Regular rhythm. No murmurs, rubs, or gallops. Distal pulses are 2+ and symmetric.  Pulmonary/Chest: No respiratory distress. Clear to auscultation bilaterally. No wheezing or rales.  Abdominal: Soft and non-distended.  There is no tenderness.  No rebound, guarding, or rigidity.  Musculoskeletal: Moves all extremities. No obvious deformities. No calf tenderness.  RLE: RLE swelling with erythema. No TTP or increased warmth. Small bite wound to calf. No palpable fluctuance or induration. no evident deformity. ROM is normal. Cap refill distally is <2 seconds. DP and PT pulses are equal and 2+ bilaterally. No motor deficit. No distal sensory deficit. NVI distally.   Skin: Warm and  "dry.  Neurological:  Alert, awake, and appropriate.  Normal speech.  No acute focal neurological deficits are appreciated.  Psychiatric: Normal affect. Good eye contact. Appropriate in content.    ED Course    Procedures  ED Vital Signs:  Vitals:    01/05/19 1651 01/05/19 1740   BP: (!) 155/74 (!) 122/59   Pulse: 89 74   Resp: 20 20   Temp: 98.3 °F (36.8 °C)    TempSrc: Oral    SpO2: 96% 96%   Weight: (!) 153.7 kg (338 lb 13.6 oz)    Height: 5' 9" (1.753 m)        Abnormal Lab Results:  Labs Reviewed   CBC W/ AUTO DIFFERENTIAL - Abnormal; Notable for the following components:       Result Value    Hemoglobin 12.6 (*)     Hematocrit 39.2 (*)     MCV 79 (*)     MCH 25.5 (*)     RDW 15.2 (*)     All other components within normal limits   COMPREHENSIVE METABOLIC PANEL - Abnormal; Notable for the following components:    Glucose 132 (*)     Anion Gap 18 (*)     All other components within normal limits   PROTIME-INR   APTT        All Lab Results:  Results for orders placed or performed during the hospital encounter of 01/05/19   CBC auto differential   Result Value Ref Range    WBC 5.32 3.90 - 12.70 K/uL    RBC 4.95 4.60 - 6.20 M/uL    Hemoglobin 12.6 (L) 14.0 - 18.0 g/dL    Hematocrit 39.2 (L) 40.0 - 54.0 %    MCV 79 (L) 82 - 98 fL    MCH 25.5 (L) 27.0 - 31.0 pg    MCHC 32.1 32.0 - 36.0 g/dL    RDW 15.2 (H) 11.5 - 14.5 %    Platelets 234 150 - 350 K/uL    MPV 9.7 9.2 - 12.9 fL    Gran # (ANC) 3.6 1.8 - 7.7 K/uL    Lymph # 1.1 1.0 - 4.8 K/uL    Mono # 0.6 0.3 - 1.0 K/uL    Eos # 0.0 0.0 - 0.5 K/uL    Baso # 0.01 0.00 - 0.20 K/uL    Gran% 67.2 38.0 - 73.0 %    Lymph% 21.1 18.0 - 48.0 %    Mono% 11.1 4.0 - 15.0 %    Eosinophil% 0.4 0.0 - 8.0 %    Basophil% 0.2 0.0 - 1.9 %    Differential Method Automated    Comprehensive metabolic panel   Result Value Ref Range    Sodium 140 136 - 145 mmol/L    Potassium 3.9 3.5 - 5.1 mmol/L    Chloride 98 95 - 110 mmol/L    CO2 24 23 - 29 mmol/L    Glucose 132 (H) 70 - 110 mg/dL    BUN, " Bld 20 8 - 23 mg/dL    Creatinine 1.1 0.5 - 1.4 mg/dL    Calcium 9.7 8.7 - 10.5 mg/dL    Total Protein 7.8 6.0 - 8.4 g/dL    Albumin 3.6 3.5 - 5.2 g/dL    Total Bilirubin 0.3 0.1 - 1.0 mg/dL    Alkaline Phosphatase 62 55 - 135 U/L    AST 33 10 - 40 U/L    ALT 22 10 - 44 U/L    Anion Gap 18 (H) 8 - 16 mmol/L    eGFR if African American >60 >60 mL/min/1.73 m^2    eGFR if non African American >60 >60 mL/min/1.73 m^2   Protime-INR   Result Value Ref Range    Prothrombin Time 11.4 9.0 - 12.5 sec    INR 1.1 0.8 - 1.2   APTT   Result Value Ref Range    aPTT 30.5 21.0 - 32.0 sec       Imaging Results:  Imaging Results          US Lower Extremity Veins Right (Final result)  Result time 01/05/19 17:49:54    Final result by Efren Oneill Jr., MD (01/05/19 17:49:54)                 Impression:      No evidence of deep venous thrombosis.      Electronically signed by: Efren Oneill MD  Date:    01/05/2019  Time:    17:49             Narrative:    EXAMINATION:  US LOWER EXTREMITY VEINS RIGHT    CLINICAL HISTORY:  Other specified soft tissue disorders    TECHNIQUE:  Real-time, color, and duplex evaluation of the deep venous vessels in the right extremity was performed.    COMPARISON:  None    FINDINGS:  No evidence of deep venous thrombosis in the lower extremity. The deep venous vessels demonstrate normal spontaneous, augmented, and respirophasicity flow. Deep venous vessels compress in a normal fashion throughout.  Several benign-appearing right inguinal lymph nodes                                        The Emergency Provider reviewed the vital signs and test results, which are outlined above.    ED Discussion     6:24 PM: Reassessed pt at this time. Outlines erythema with skin marker. Advised pt to return for worsening erythema, increased warmth, fever, chills, or for any other concerns. Advised pt to f/u with his PCP in 2 days for a recheck. Discussed with pt all pertinent ED information and results. Discussed pt dx   and plan of tx. Gave pt all f/u and return to the ED instructions. All questions and concerns were addressed at this time. Pt expresses understanding of information and instructions, and is comfortable with plan to discharge. Pt is stable for discharge.    I discussed with patient and/or family/caretaker that evaluation in the ED does not suggest any emergent or life threatening medical conditions requiring immediate intervention beyond what was provided in the ED, and I believe patient is safe for discharge.  Regardless, an unremarkable evaluation in the ED does not preclude the development or presence of a serious of life threatening condition. As such, patient was instructed to return immediately for any worsening or change in current symptoms.    ED Medication(s):  Medications - No data to display    Follow-up Information     Primary Doctor No. Schedule an appointment as soon as possible for a visit in 2 days.    Why:  Bryan an appt with Dr. Roach this MOnday for recheck of your right leg           [facility] - .    Specialty:  Emergency Medicine  Why:  return here if redness extends outside outlined area or if fever chills  Contact information:  [address] [facility] [address] [phone]                 Current Discharge Medication List      START taking these medications    Details   mupirocin (BACTROBAN) 2 % ointment Apply topically 2 (two) times daily. Apply small amoutn of ointment to bite wound open area on the back of yoru right calf every 12 hours until healed - just clean area with soap and water  Qty: 22 g, Refills: 0      sulfamethoxazole-trimethoprim 800-160mg (BACTRIM DS) 800-160 mg Tab Take 1 tablet by mouth 2 (two) times daily. for 7 days  Qty: 14 tablet, Refills: 0             Medical Decision Making    Medical Decision Making:   Clinical Tests:   Lab Tests: Ordered and Reviewed  Radiological Study: Ordered and Reviewed           Scribe  Attestation:   Scribe #1: I performed the above scribed service and the documentation accurately describes the services I performed. I attest to the accuracy of the note.    Attending:   Physician Attestation Statement for Scribe #1: I, Ashu Hunt Jr., MD, personally performed the services described in this documentation, as scribed by Aline Parker, in my presence, and it is both accurate and complete.          Clinical Impression       ICD-10-CM ICD-9-CM   1. Infected bite wound T14.8XXA 879.9    L08.9    2. Right leg swelling M79.89 729.81   3. Cellulitis of right leg L03.115 682.6       Disposition:   Disposition: Discharged  Condition: Stable         Ashu Hunt Jr., MD  01/05/19 5200

## 2022-08-25 ENCOUNTER — HOSPITAL ENCOUNTER (EMERGENCY)
Facility: HOSPITAL | Age: 67
Discharge: HOME | End: 2022-08-25
Attending: EMERGENCY MEDICINE
Payer: MEDICARE

## 2022-08-25 VITALS
TEMPERATURE: 99 F | OXYGEN SATURATION: 97 % | WEIGHT: 315 LBS | BODY MASS INDEX: 46.65 KG/M2 | HEART RATE: 73 BPM | SYSTOLIC BLOOD PRESSURE: 105 MMHG | HEIGHT: 69 IN | DIASTOLIC BLOOD PRESSURE: 59 MMHG | RESPIRATION RATE: 20 BRPM

## 2022-08-25 DIAGNOSIS — R07.9 CHEST PAIN, UNSPECIFIED TYPE: Primary | ICD-10-CM

## 2022-08-25 DIAGNOSIS — R07.9 CHEST PAIN: ICD-10-CM

## 2022-08-25 LAB
ALBUMIN SERPL BCP-MCNC: 4.2 G/DL (ref 3.5–5.2)
ALP SERPL-CCNC: 83 U/L (ref 55–135)
ALT SERPL W/O P-5'-P-CCNC: 16 U/L (ref 10–44)
ANION GAP SERPL CALC-SCNC: 14 MMOL/L (ref 8–16)
AST SERPL-CCNC: 15 U/L (ref 10–40)
BASOPHILS # BLD AUTO: 0.02 K/UL (ref 0–0.2)
BASOPHILS NFR BLD: 0.3 % (ref 0–1.9)
BILIRUB SERPL-MCNC: 0.5 MG/DL (ref 0.1–1)
BNP SERPL-MCNC: 37 PG/ML (ref 0–99)
BUN SERPL-MCNC: 16 MG/DL (ref 8–23)
CALCIUM SERPL-MCNC: 9.4 MG/DL (ref 8.7–10.5)
CHLORIDE SERPL-SCNC: 103 MMOL/L (ref 95–110)
CO2 SERPL-SCNC: 26 MMOL/L (ref 23–29)
CREAT SERPL-MCNC: 0.9 MG/DL (ref 0.5–1.4)
DIFFERENTIAL METHOD: ABNORMAL
EOSINOPHIL # BLD AUTO: 0.1 K/UL (ref 0–0.5)
EOSINOPHIL NFR BLD: 0.9 % (ref 0–8)
ERYTHROCYTE [DISTWIDTH] IN BLOOD BY AUTOMATED COUNT: 13.4 % (ref 11.5–14.5)
EST. GFR  (NO RACE VARIABLE): >60 ML/MIN/1.73 M^2
GLUCOSE SERPL-MCNC: 210 MG/DL (ref 70–110)
HCT VFR BLD AUTO: 45.1 % (ref 40–54)
HGB BLD-MCNC: 14.6 G/DL (ref 14–18)
IMM GRANULOCYTES # BLD AUTO: 0.04 K/UL (ref 0–0.04)
IMM GRANULOCYTES NFR BLD AUTO: 0.5 % (ref 0–0.5)
LYMPHOCYTES # BLD AUTO: 1.3 K/UL (ref 1–4.8)
LYMPHOCYTES NFR BLD: 16.8 % (ref 18–48)
MCH RBC QN AUTO: 27.6 PG (ref 27–31)
MCHC RBC AUTO-ENTMCNC: 32.4 G/DL (ref 32–36)
MCV RBC AUTO: 85 FL (ref 82–98)
MONOCYTES # BLD AUTO: 0.7 K/UL (ref 0.3–1)
MONOCYTES NFR BLD: 9.4 % (ref 4–15)
NEUTROPHILS # BLD AUTO: 5.5 K/UL (ref 1.8–7.7)
NEUTROPHILS NFR BLD: 72.1 % (ref 38–73)
NRBC BLD-RTO: 0 /100 WBC
PLATELET # BLD AUTO: 229 K/UL (ref 150–450)
PMV BLD AUTO: 9.7 FL (ref 9.2–12.9)
POCT GLUCOSE: 202 MG/DL (ref 70–110)
POTASSIUM SERPL-SCNC: 3.9 MMOL/L (ref 3.5–5.1)
PROT SERPL-MCNC: 7.8 G/DL (ref 6–8.4)
RBC # BLD AUTO: 5.29 M/UL (ref 4.6–6.2)
SODIUM SERPL-SCNC: 143 MMOL/L (ref 136–145)
TROPONIN I SERPL DL<=0.01 NG/ML-MCNC: 0.01 NG/ML (ref 0–0.03)
TROPONIN I SERPL DL<=0.01 NG/ML-MCNC: <0.006 NG/ML (ref 0–0.03)
WBC # BLD AUTO: 7.68 K/UL (ref 3.9–12.7)

## 2022-08-25 PROCEDURE — 63600175 PHARM REV CODE 636 W HCPCS: Performed by: EMERGENCY MEDICINE

## 2022-08-25 PROCEDURE — 80053 COMPREHEN METABOLIC PANEL: CPT | Performed by: EMERGENCY MEDICINE

## 2022-08-25 PROCEDURE — 96374 THER/PROPH/DIAG INJ IV PUSH: CPT

## 2022-08-25 PROCEDURE — 93010 EKG 12-LEAD: ICD-10-PCS | Mod: ,,, | Performed by: INTERNAL MEDICINE

## 2022-08-25 PROCEDURE — 84484 ASSAY OF TROPONIN QUANT: CPT | Mod: 91 | Performed by: EMERGENCY MEDICINE

## 2022-08-25 PROCEDURE — 85025 COMPLETE CBC W/AUTO DIFF WBC: CPT | Performed by: EMERGENCY MEDICINE

## 2022-08-25 PROCEDURE — 99285 EMERGENCY DEPT VISIT HI MDM: CPT | Mod: 25

## 2022-08-25 PROCEDURE — 83880 ASSAY OF NATRIURETIC PEPTIDE: CPT | Performed by: EMERGENCY MEDICINE

## 2022-08-25 PROCEDURE — 93010 ELECTROCARDIOGRAM REPORT: CPT | Mod: ,,, | Performed by: INTERNAL MEDICINE

## 2022-08-25 PROCEDURE — 93005 ELECTROCARDIOGRAM TRACING: CPT

## 2022-08-25 PROCEDURE — 82962 GLUCOSE BLOOD TEST: CPT

## 2022-08-25 PROCEDURE — 96361 HYDRATE IV INFUSION ADD-ON: CPT

## 2022-08-25 PROCEDURE — 25000003 PHARM REV CODE 250: Performed by: EMERGENCY MEDICINE

## 2022-08-25 RX ORDER — KETOROLAC TROMETHAMINE 30 MG/ML
15 INJECTION, SOLUTION INTRAMUSCULAR; INTRAVENOUS
Status: COMPLETED | OUTPATIENT
Start: 2022-08-25 | End: 2022-08-25

## 2022-08-25 RX ORDER — ASPIRIN 325 MG
325 TABLET ORAL
Status: COMPLETED | OUTPATIENT
Start: 2022-08-25 | End: 2022-08-25

## 2022-08-25 RX ADMIN — ASPIRIN 325 MG ORAL TABLET 325 MG: 325 PILL ORAL at 11:08

## 2022-08-25 RX ADMIN — SODIUM CHLORIDE 500 ML: 0.9 INJECTION, SOLUTION INTRAVENOUS at 01:08

## 2022-08-25 RX ADMIN — KETOROLAC TROMETHAMINE 15 MG: 30 INJECTION, SOLUTION INTRAMUSCULAR at 11:08

## 2022-08-25 NOTE — ED PROVIDER NOTES
SCRIBE #1 NOTE: I, Delgado Serrano, am scribing for, and in the presence of, Ivan Garza Jr., MD. I have scribed the entire note.      History      Chief Complaint   Patient presents with    Chest Pain     CP x 3 hours mid sternal tightness/pressure       Review of patient's allergies indicates:   Allergen Reactions    Ace inhibitors     Lisinopril      Hiccups        HPI   HPI    8/25/2022, 11:18 AM   History obtained from the patient      History of Present Illness: Dwayne Mack is a 67 y.o. male patient with a PMHx of HTN, DM, CHF who presents to the Emergency Department for chest pain, onset 3 hours PTA. Pt describes the pain as a pressure and tightness. Symptoms are constant and moderate in severity. No mitigating or exacerbating factors reported. No associated sxs reported. Patient denies any fever, chills, n/v/d, SOB, weakness, numbness, dizziness, headache, and all other sxs at this time. No prior Tx reported. Pt states that he is not compliant with his insulin regimen. No further complaints or concerns at this time.     Arrival mode: Personal vehicle    PCP: Primary Doctor No       Past Medical History:  Past Medical History:   Diagnosis Date    Anemia     Arthritis     Diabetes mellitus     Hypertension     Pneumonia        Past Surgical History:  Past Surgical History:   Procedure Laterality Date    [facility]  03/10/2018    [facility]    KIDNEY STONE SURGERY  10/17/2018    UMBILICAL HERNIA REPAIR Left 2015    [facility]         Family History:  Family History   Problem Relation Age of Onset    Alzheimer's disease Mother     Cancer Father     Hypertension Sister     Hypertension Brother        Social History:  Social History     Tobacco Use    Smoking status: Never Smoker    Smokeless tobacco: Never Used   Substance and Sexual Activity    Alcohol use: Yes     Comment: Occasionally/socially    Drug use: No    Sexual activity: Not on file       ROS    Review of Systems   Constitutional: Negative for chills and fever.   HENT: Negative for sore throat.    Respiratory: Negative for shortness of breath.    Cardiovascular: Positive for chest pain (pressure/tightness).   Gastrointestinal: Negative for diarrhea, nausea and vomiting.   Genitourinary: Negative for dysuria.   Musculoskeletal: Negative for back pain.   Skin: Negative for rash.   Neurological: Negative for dizziness, weakness, light-headedness, numbness and headaches.   Hematological: Does not bruise/bleed easily.   All other systems reviewed and are negative.    Physical Exam      Initial Vitals [08/25/22 1105]   BP Pulse Resp Temp SpO2   116/62 84 18 98.5 °F (36.9 °C) 96 %      MAP       --          Physical Exam  Nursing Notes and Vital Signs Reviewed.  Constitutional: Patient is in no acute distress. Well-developed and well-nourished.  Head: Atraumatic. Normocephalic.  Eyes:  EOM intact.  No scleral icterus.  ENT: Mucous membranes are moist.  Nares clear   Neck:  Full ROM. No JVD.  Cardiovascular: Regular rate. Regular rhythm No murmurs, rubs, or gallops. Distal pulses are 2+ and symmetric  Pulmonary/Chest: No respiratory distress. Clear to auscultation bilaterally. No wheezing or rales.  Equal chest wall rise bilaterally  Abdominal: Soft and non-distended.  There is no tenderness.  No rebound, guarding, or rigidity. Good bowel sounds.  Genitourinary: No CVA tenderness.  No suprapubic tenderness  Musculoskeletal: Moves all extremities. No obvious deformities.  5 x 5 strength in all extremities   Skin: Warm and dry.  Neurological:  Alert, awake, and appropriate.  Normal speech.  No acute focal neurological deficits are appreciated.  Two through 12 intact bilaterally.  Psychiatric: Normal affect. Good eye contact. Appropriate in content.    ED Course    Procedures  ED Vital Signs:  Vitals:    08/25/22 1105 08/25/22 1120 08/25/22 1121 08/25/22 1201   BP: 116/62  135/67 (!) 106/55   Pulse: 84 81 79 70  "  Resp: 18  11 15   Temp: 98.5 °F (36.9 °C)      TempSrc: Oral      SpO2: 96%  95% 95%   Weight: (!) 148.3 kg (327 lb)      Height: 5' 9" (1.753 m)       08/25/22 1302 08/25/22 1346   BP: (!) 93/53 103/61   Pulse: 70 79   Resp:  20   Temp:     TempSrc:     SpO2:  95%   Weight:     Height:         Abnormal Lab Results:  Labs Reviewed   CBC W/ AUTO DIFFERENTIAL - Abnormal; Notable for the following components:       Result Value    Lymph % 16.8 (*)     All other components within normal limits   COMPREHENSIVE METABOLIC PANEL - Abnormal; Notable for the following components:    Glucose 210 (*)     All other components within normal limits   POCT GLUCOSE - Abnormal; Notable for the following components:    POCT Glucose 202 (*)     All other components within normal limits   TROPONIN I   TROPONIN I   B-TYPE NATRIURETIC PEPTIDE        All Lab Results:  Results for orders placed or performed during the hospital encounter of 08/25/22   CBC auto differential   Result Value Ref Range    WBC 7.68 3.90 - 12.70 K/uL    RBC 5.29 4.60 - 6.20 M/uL    Hemoglobin 14.6 14.0 - 18.0 g/dL    Hematocrit 45.1 40.0 - 54.0 %    MCV 85 82 - 98 fL    MCH 27.6 27.0 - 31.0 pg    MCHC 32.4 32.0 - 36.0 g/dL    RDW 13.4 11.5 - 14.5 %    Platelets 229 150 - 450 K/uL    MPV 9.7 9.2 - 12.9 fL    Immature Granulocytes 0.5 0.0 - 0.5 %    Gran # (ANC) 5.5 1.8 - 7.7 K/uL    Immature Grans (Abs) 0.04 0.00 - 0.04 K/uL    Lymph # 1.3 1.0 - 4.8 K/uL    Mono # 0.7 0.3 - 1.0 K/uL    Eos # 0.1 0.0 - 0.5 K/uL    Baso # 0.02 0.00 - 0.20 K/uL    nRBC 0 0 /100 WBC    Gran % 72.1 38.0 - 73.0 %    Lymph % 16.8 (L) 18.0 - 48.0 %    Mono % 9.4 4.0 - 15.0 %    Eosinophil % 0.9 0.0 - 8.0 %    Basophil % 0.3 0.0 - 1.9 %    Differential Method Automated    Comprehensive metabolic panel   Result Value Ref Range    Sodium 143 136 - 145 mmol/L    Potassium 3.9 3.5 - 5.1 mmol/L    Chloride 103 95 - 110 mmol/L    CO2 26 23 - 29 mmol/L    Glucose 210 (H) 70 - 110 mg/dL    BUN 16 " 8 - 23 mg/dL    Creatinine 0.9 0.5 - 1.4 mg/dL    Calcium 9.4 8.7 - 10.5 mg/dL    Total Protein 7.8 6.0 - 8.4 g/dL    Albumin 4.2 3.5 - 5.2 g/dL    Total Bilirubin 0.5 0.1 - 1.0 mg/dL    Alkaline Phosphatase 83 55 - 135 U/L    AST 15 10 - 40 U/L    ALT 16 10 - 44 U/L    Anion Gap 14 8 - 16 mmol/L    eGFR >60 >60 mL/min/1.73 m^2   Troponin I #1   Result Value Ref Range    Troponin I 0.008 0.000 - 0.026 ng/mL   Troponin I #2   Result Value Ref Range    Troponin I <0.006 0.000 - 0.026 ng/mL   BNP   Result Value Ref Range    BNP 37 0 - 99 pg/mL   POCT glucose   Result Value Ref Range    POCT Glucose 202 (H) 70 - 110 mg/dL     Imaging Results:  Imaging Results          X-Ray Chest AP Portable (Final result)  Result time 08/25/22 12:19:34    Final result by Betzy Cerna MD (08/25/22 12:19:34)                 Impression:      Blunting of the left costophrenic angle compatible with left pleural effusion with likely associated atelectasis or consolidation.      Electronically signed by: Betzy Cerna  Date:    08/25/2022  Time:    12:19             Narrative:    EXAMINATION:  XR CHEST AP PORTABLE    CLINICAL HISTORY:  Chest Pain;    TECHNIQUE:  Single frontal view of the chest was performed.    COMPARISON:  None    FINDINGS:  ECG monitoring leads overlie the chest.The right lung is clear without significant pleural fluid.  There is blunting of the left costophrenic angle, compatible with left pleural fluid with likely associated atelectasis or consolidation.  No pneumothorax.    The cardiac silhouette is normal in size.    There is degenerative change of the spine without identified acute osseous abnormality.                               The EKG was ordered, reviewed, and independently interpreted by the ED provider.  Interpretation time: 11:00  Rate: 80 BPM  Rhythm: normal sinus rhythm  Interpretation: Left axis deviation. Inferior infarct, age undetermined. No STEMI.           The Emergency Provider reviewed  the vital signs and test results, which are outlined above.    ED Discussion     2:45 PM: Reassessed pt at this time. Discussed with pt all pertinent ED information and results. Discussed pt dx and plan of tx. Gave pt all f/u and return to the ED instructions. All questions and concerns were addressed at this time. Pt expresses understanding of information and instructions, and is comfortable with plan to discharge. Pt is stable for discharge.    I discussed with patient and/or family/caretaker that evaluation in the ED does not suggest any emergent or life threatening medical conditions requiring immediate intervention beyond what was provided in the ED, and I believe patient is safe for discharge.  Regardless, an unremarkable evaluation in the ED does not preclude the development or presence of a serious of life threatening condition. As such, patient was instructed to return immediately for any worsening or change in current symptoms.      patient is stable nontoxic with unchanged EKG and x2 negative enzymes.  Had a stress test last month that was negative by his cardiologist .  Patient is under significant amount of stress.  This may be the cause of his chest tightness.  Not describe pain.  Stable safe for discharge my opinion.  Discussed all findings with he and his family.  They verbalized understanding and agreement with all instructions and seems reliable.    ED Medication(s):  Medications   aspirin tablet 325 mg (325 mg Oral Given 8/25/22 1128)   ketorolac injection 15 mg (15 mg Intravenous Given 8/25/22 1140)   sodium chloride 0.9% bolus 500 mL (500 mLs Intravenous New Bag 8/25/22 1338)        Follow-up Information     Brent Mcdaniel MD.    Specialties: Cardiovascular Disease, Cardiology  Contact information:  [address] [facility] [address] [phone]                        New Prescriptions    No medications on file         Medical Decision Making    Medical Decision Making:    Clinical Tests:   Lab Tests: Ordered and Reviewed  Radiological Study: Ordered and Reviewed  Medical Tests: Ordered and Reviewed           Scribe Attestation:   Scribe #1: I performed the above scribed service and the documentation accurately describes the services I performed. I attest to the accuracy of the note.    Attending:   Physician Attestation Statement for Scribe #1: I, Ivan Garza Jr., MD, personally performed the services described in this documentation, as scribed by Delgado Serrano, in my presence, and it is both accurate and complete.          Clinical Impression       ICD-10-CM ICD-9-CM   1. Chest pain, unspecified type  R07.9 786.50   2. Chest pain  R07.9 786.50       Disposition:   Disposition: Discharged  Condition: Stable         Ivan Garza Jr., MD  08/25/22 1095

## 2022-08-25 NOTE — DISCHARGE INSTRUCTIONS
Continue all home medications.  Follow up with her cardiologist next available.  Return as needed for any worsening symptoms, problems, questions or concerns.

## 2022-12-16 ENCOUNTER — PATIENT MESSAGE (OUTPATIENT)
Dept: RESEARCH | Facility: HOSPITAL | Age: 67
End: 2022-12-16
Payer: MEDICARE